# Patient Record
Sex: FEMALE | URBAN - METROPOLITAN AREA
[De-identification: names, ages, dates, MRNs, and addresses within clinical notes are randomized per-mention and may not be internally consistent; named-entity substitution may affect disease eponyms.]

---

## 2020-03-03 ENCOUNTER — OFFICE VISIT (OUTPATIENT)
Dept: URBAN - METROPOLITAN AREA CLINIC 71 | Facility: CLINIC | Age: 55
End: 2020-03-03
Payer: COMMERCIAL

## 2020-03-03 PROCEDURE — 92002 INTRM OPH EXAM NEW PATIENT: CPT | Performed by: OPHTHALMOLOGY

## 2020-03-03 RX ORDER — CIPROFLOXACIN HYDROCHLORIDE 3.5 MG/ML
0.3 % SOLUTION/ DROPS TOPICAL
Qty: 1 | Refills: 1 | Status: ACTIVE
Start: 2020-03-03

## 2020-03-03 NOTE — IMPRESSION/PLAN
Impression: Corneal abrasion without FB of eye, initial encounter: S05.00xA. Central. Gave patient a drop of cipro in clinic. Inserted BCL OS - good fit. Plan: Patient to use artificial tears every hour for comfort. Keep BCL in OS. Will recheck tomorrow.

## 2020-03-04 ENCOUNTER — OFFICE VISIT (OUTPATIENT)
Dept: URBAN - METROPOLITAN AREA CLINIC 71 | Facility: CLINIC | Age: 55
End: 2020-03-04
Payer: COMMERCIAL

## 2020-03-04 DIAGNOSIS — S05.00XA CORNEAL ABRASION WITHOUT FB OF EYE, INITIAL ENCOUNTER: Primary | ICD-10-CM

## 2020-03-04 PROCEDURE — 99214 OFFICE O/P EST MOD 30 MIN: CPT | Performed by: OPHTHALMOLOGY

## 2020-03-04 NOTE — IMPRESSION/PLAN
Impression: Corneal abrasion without FB of eye, initial encounter: S05.00XA. Plan: OS: Discussed diagnosis in detail with patient. Will continue to observe condition and or symptoms. Replaced bandage contact lens in the left eye today.   Patient advised to call if problem worsens or notices any changes

## 2020-03-06 ENCOUNTER — OFFICE VISIT (OUTPATIENT)
Dept: URBAN - METROPOLITAN AREA CLINIC 71 | Facility: CLINIC | Age: 55
End: 2020-03-06
Payer: COMMERCIAL

## 2020-03-06 PROCEDURE — 92012 INTRM OPH EXAM EST PATIENT: CPT | Performed by: OPHTHALMOLOGY

## 2020-03-06 ASSESSMENT — KERATOMETRY
OD: 45.13
OS: 44.75

## 2020-03-06 NOTE — IMPRESSION/PLAN
Impression: Corneal abrasion without FB of eye, initial encounter: S05.00XA. Plan: Corneal Abrasion is about 98% resolved. 0.5mm Remains. Continue Cipro drop as directed QID OS.

## 2020-03-09 ENCOUNTER — OFFICE VISIT (OUTPATIENT)
Dept: URBAN - METROPOLITAN AREA CLINIC 71 | Facility: CLINIC | Age: 55
End: 2020-03-09
Payer: COMMERCIAL

## 2020-03-09 DIAGNOSIS — S05.00XS CORNEAL ABRASION WITHOUT FB OF EYE, SEQUELA: Primary | ICD-10-CM

## 2020-03-09 DIAGNOSIS — H40.053 OCULAR HYPERTENSION, BILATERAL: ICD-10-CM

## 2020-03-09 PROCEDURE — 99214 OFFICE O/P EST MOD 30 MIN: CPT | Performed by: OPHTHALMOLOGY

## 2020-03-09 ASSESSMENT — INTRAOCULAR PRESSURE
OD: 23
OS: 26

## 2020-03-09 NOTE — IMPRESSION/PLAN
Impression: Corneal abrasion without FB of eye, sequela: S05.00XS. Plan: OS: No treatment is required at this time. Will continue to observe condition and or symptoms.  Continue using artificial tears OU

## 2020-03-09 NOTE — IMPRESSION/PLAN
Impression: Ocular hypertension, bilateral: H40.053. Plan: Ocular Hypertension is elevated intraocular pressure that may cause glaucoma and require treatment. Not every patient with ocular hypertension needs treatment. Ocular Hypertension may need to be treated with glaucoma eye drops. Contact Office if: Ocular hypertension is associated with eye pain, blurry vision, halos around lights, and loss of peripheral vision. Recent studies have shown that patients with thin corneas, and a family history of glaucoma are more likely to develop glaucoma.